# Patient Record
Sex: MALE | Race: WHITE | NOT HISPANIC OR LATINO | Employment: UNEMPLOYED | URBAN - METROPOLITAN AREA
[De-identification: names, ages, dates, MRNs, and addresses within clinical notes are randomized per-mention and may not be internally consistent; named-entity substitution may affect disease eponyms.]

---

## 2024-07-11 ENCOUNTER — APPOINTMENT (OUTPATIENT)
Dept: RADIOLOGY | Facility: HOSPITAL | Age: 44
End: 2024-07-11

## 2024-07-11 ENCOUNTER — HOSPITAL ENCOUNTER (EMERGENCY)
Facility: HOSPITAL | Age: 44
Discharge: HOME | End: 2024-07-11
Attending: EMERGENCY MEDICINE

## 2024-07-11 VITALS
BODY MASS INDEX: 21.47 KG/M2 | SYSTOLIC BLOOD PRESSURE: 150 MMHG | TEMPERATURE: 98.2 F | WEIGHT: 150 LBS | OXYGEN SATURATION: 100 % | HEART RATE: 65 BPM | DIASTOLIC BLOOD PRESSURE: 89 MMHG | RESPIRATION RATE: 18 BRPM | HEIGHT: 70 IN

## 2024-07-11 DIAGNOSIS — S61.310A LACERATION OF RIGHT INDEX FINGER WITHOUT FOREIGN BODY WITH DAMAGE TO NAIL, INITIAL ENCOUNTER: ICD-10-CM

## 2024-07-11 DIAGNOSIS — S62.630B OPEN DISPLACED FRACTURE OF DISTAL PHALANX OF RIGHT INDEX FINGER, INITIAL ENCOUNTER: Primary | ICD-10-CM

## 2024-07-11 PROCEDURE — 73140 X-RAY EXAM OF FINGER(S): CPT | Mod: RT

## 2024-07-11 PROCEDURE — 2500000001 HC RX 250 WO HCPCS SELF ADMINISTERED DRUGS (ALT 637 FOR MEDICARE OP): Performed by: EMERGENCY MEDICINE

## 2024-07-11 PROCEDURE — 99283 EMERGENCY DEPT VISIT LOW MDM: CPT | Mod: 25

## 2024-07-11 PROCEDURE — 73140 X-RAY EXAM OF FINGER(S): CPT | Mod: RIGHT SIDE | Performed by: RADIOLOGY

## 2024-07-11 PROCEDURE — 2500000005 HC RX 250 GENERAL PHARMACY W/O HCPCS: Performed by: EMERGENCY MEDICINE

## 2024-07-11 RX ORDER — CEPHALEXIN 500 MG/1
500 CAPSULE ORAL 4 TIMES DAILY
Qty: 12 CAPSULE | Refills: 0 | Status: SHIPPED | OUTPATIENT
Start: 2024-07-11 | End: 2024-07-14

## 2024-07-11 RX ORDER — IBUPROFEN 400 MG/1
400 TABLET ORAL ONCE
Status: COMPLETED | OUTPATIENT
Start: 2024-07-11 | End: 2024-07-11

## 2024-07-11 RX ORDER — AMOXICILLIN AND CLAVULANATE POTASSIUM 875; 125 MG/1; MG/1
1 TABLET, FILM COATED ORAL ONCE
Status: COMPLETED | OUTPATIENT
Start: 2024-07-11 | End: 2024-07-11

## 2024-07-11 RX ORDER — LIDOCAINE HYDROCHLORIDE 20 MG/ML
5 INJECTION, SOLUTION INFILTRATION; PERINEURAL ONCE
Status: COMPLETED | OUTPATIENT
Start: 2024-07-11 | End: 2024-07-11

## 2024-07-11 RX ADMIN — LIDOCAINE HYDROCHLORIDE 5 ML: 20 INJECTION, SOLUTION INFILTRATION; PERINEURAL at 02:10

## 2024-07-11 RX ADMIN — AMOXICILLIN AND CLAVULANATE POTASSIUM 1 TABLET: 875; 125 TABLET, FILM COATED ORAL at 02:10

## 2024-07-11 RX ADMIN — IBUPROFEN 400 MG: 400 TABLET, FILM COATED ORAL at 02:10

## 2024-07-11 ASSESSMENT — LIFESTYLE VARIABLES
EVER HAD A DRINK FIRST THING IN THE MORNING TO STEADY YOUR NERVES TO GET RID OF A HANGOVER: NO
TOTAL SCORE: 0
EVER FELT BAD OR GUILTY ABOUT YOUR DRINKING: NO
HAVE YOU EVER FELT YOU SHOULD CUT DOWN ON YOUR DRINKING: NO
HAVE PEOPLE ANNOYED YOU BY CRITICIZING YOUR DRINKING: NO

## 2024-07-11 ASSESSMENT — PAIN DESCRIPTION - LOCATION: LOCATION: FINGER (COMMENT WHICH ONE)

## 2024-07-11 ASSESSMENT — PAIN - FUNCTIONAL ASSESSMENT: PAIN_FUNCTIONAL_ASSESSMENT: 0-10

## 2024-07-11 ASSESSMENT — PAIN DESCRIPTION - ORIENTATION: ORIENTATION: RIGHT

## 2024-07-11 ASSESSMENT — COLUMBIA-SUICIDE SEVERITY RATING SCALE - C-SSRS
2. HAVE YOU ACTUALLY HAD ANY THOUGHTS OF KILLING YOURSELF?: NO
1. IN THE PAST MONTH, HAVE YOU WISHED YOU WERE DEAD OR WISHED YOU COULD GO TO SLEEP AND NOT WAKE UP?: NO
6. HAVE YOU EVER DONE ANYTHING, STARTED TO DO ANYTHING, OR PREPARED TO DO ANYTHING TO END YOUR LIFE?: NO

## 2024-07-11 ASSESSMENT — PAIN DESCRIPTION - PAIN TYPE: TYPE: ACUTE PAIN

## 2024-07-11 ASSESSMENT — PAIN SCALES - GENERAL: PAINLEVEL_OUTOF10: 6

## 2024-07-11 NOTE — ED TRIAGE NOTES
Working in a wood shop, cut right index finger with a knife. Bleeding controled. Tetanus up to date. Bleeding controled.

## 2024-07-11 NOTE — ED PROVIDER NOTES
Pt Name: Sawyer Sarah  MRN: [id number]  Birthdate 1980  Date of evaluation: 7/11/2024  [facility] ED      CHIEF COMPLAINT    Chief Complaint   Patient presents with    Finger Laceration     Knife injury in wood shop area       HPI  43 y.o. male presents with with right index finger laceration.  He is mostly right-handed.  Happened with the knife at home.  No fever.   Patient would like his wound with hanging tissue to be debrided otherwise he reports it will smell and get infected.    REVIEW OF SYSTEMS     Review of Systems    Pmh:  There is no problem list on file for this patient.      CURRENT MEDICATIONS       Previous Medications    No medications on file       No family history on file.    Social Determinants of Health     Tobacco Use: High Risk (11/7/2019)    Received from [facility]    Patient History     Smoking Tobacco Use: Every Day     Smokeless Tobacco Use: Never     Passive Exposure: Not on file   Alcohol Use: Not on file   Financial Resource Strain: Not on file   Food Insecurity: Not on file   Transportation Needs: Not on file   Physical Activity: Not on file   Stress: Not on file   Social Connections: Not on file   Intimate Partner Violence: Not on file   Depression: Not on file   Housing Stability: Not on file   Utilities: Not on file   Digital Equity: Not on file   Health Literacy: Not on file       Physical Exam  Vitals and nursing note reviewed.   Constitutional:       Appearance: He is not toxic-appearing.   Cardiovascular:      Rate and Rhythm: Normal rate and regular rhythm.      Pulses:           Radial pulses are 2+ on the right side.   Musculoskeletal:      Right wrist: Normal.      Right hand: Laceration and tenderness present.      Comments: Soft tissue laceration soft tissue amputation of right index finger with skin tissue missing   Skin:     Coloration: Skin is not jaundiced.      Findings: Laceration present.   Neurological:      Mental Status: He is alert.      Sensory:  "Sensation is intact.      Gait: Gait is intact.   Psychiatric:         Behavior: Behavior normal.         Thought Content: Thought content normal.       Vitals:    07/11/24 0130   BP: 150/89   Pulse: 65   Resp: 18   Temp: 36.8 °C (98.2 °F)   SpO2: 100%   Weight: 68 kg (150 lb)   Height: 1.778 m (5' 10\")         Medical Decision Making       SCREENINGS   [place] Coma Scale  Best Eye Response: Spontaneous  Best Verbal Response: Oriented  Best Motor Response: Follows commands  [place] Coma Scale Score: 15     Wound Care    Performed by: Samuel Abernathy MD  Authorized by: Samuel Abernathy MD    Consent:     Consent obtained:  Verbal    Consent given by:  Patient    Risks discussed:  Bleeding, infection, pain, poor cosmetic result, nerve damage and incomplete drainage  Procedure details:     Indications: open wounds      Wound location:  Finger    Finger location:  R index finger    Wound age (days):  <1    Wound surface area (sq cm):  1.5    Debridement performed: Yes      Debridement type: excisional      Debridement level: subcutaneous tissue      Debridement mechanism:  Scissors    Devitalized tissue debrided: necrotic debris    Comments:      Wound is not suturable as there is tissue missing.  Devitalized hanging by small tissue fragment tissue was debrided to allow for appropriate proper wound healing.         Imgaing:  XR fingers right 2+ views   Final Result   1. Soft tissue avulsion/laceration of the distal index finger.   2. Subtle punctate high-density focus at the radial aspect of the   laceration site which could reflect foreign body or small osseous   fragment.   3. Slight cortical irregularity of the distal phalanx tuft which   could reflect small chip fracture.        Signed by: Homar Alcala 7/11/2024 2:25 AM   Dictation workstation:   [id number]          Labs:  Labs Reviewed - No data to display    EKG:  No orders to display       Medications ordered:  Medications   gelatin sponge,absorb-porcine " (Gelfoam) sponge 1 each (has no administration in time range)   lidocaine (Xylocaine) 20 mg/mL (2 %) injection 5 mL (5 mL subcutaneous Given 7/11/24 0210)   amoxicillin-pot clavulanate (Augmentin) 875-125 mg per tablet 1 tablet (1 tablet oral Given 7/11/24 0210)   ibuprofen tablet 400 mg (400 mg oral Given 7/11/24 0210)         Orders placed during visit:  XR fingers right 2+ views   Final Result   1. Soft tissue avulsion/laceration of the distal index finger.   2. Subtle punctate high-density focus at the radial aspect of the   laceration site which could reflect foreign body or small osseous   fragment.   3. Slight cortical irregularity of the distal phalanx tuft which   could reflect small chip fracture.        Signed by: Homar Alcala 7/11/2024 2:25 AM   Dictation workstation:   [id number]          Diagnoses as of 07/11/24 0325   Laceration of right index finger without foreign body with damage to nail, initial encounter   Open displaced fracture of distal phalanx of right index finger, initial encounter       CONSULTS:  None    CRITICAL CARE TIME          MDM: 43 y.o. presented with  complaint of finger laceration.  The differential diagnosis considered: Fracture, laceration, soft tissue amputation.  Our workup consisted of ordering/reviewing:   Orders Placed This Encounter   Procedures    Wound Care    Wound Care    Wound Care    XR fingers right 2+ views    Application finger splint static   X-ray reviewed and interpreted by me: Radiopacity in the soft tissue there appears to be a fracture of line in the distal phalanx of the index finger.  Soft tissue laceration    Patient would likely open fracture of the index finger.  Wound was irrigated copiously by me.    Patient refused to wait for hand surgery consultation.  Risk benefits alternatives were discussed with the patient and he refused to wait for hand surgery consultation.  Patient agreed and understands risk of deformity, disability infection,  inability to are in a living.    Wound care with surgicell and  dressing  Procedure note:initial fracture care and  splinting or strapping  An aluminium foam splinting   by nurse to the fractured finger . Post splint application patient was neurovascularly intact and alignment was good  Samuel Abernathy MD         Clinical impression:  1. Open displaced fracture of distal phalanx of right index finger, initial encounter  cephalexin (Keflex) 500 mg capsule      2. Laceration of right index finger without foreign body with damage to nail, initial encounter  cephalexin (Keflex) 500 mg capsule          DISPOSITION/PLAN   DISPOSITION Discharge 07/11/2024 03:15:42 AM       I prescribed the following medications:  New Prescriptions    CEPHALEXIN (KEFLEX) 500 MG CAPSULE    Take 1 capsule (500 mg) by mouth 4 times a day for 3 days.       PATIENT REFERRED TO:  Josep Baum MD  [address] [facility]  [address]  [facility] [address]  [phone]    Call in 1 day      Conrad Frey MD  [address] [facility] [id number] [phone]    In 4 days      Sidney Espinosa MD  [address] [facility]  [address]  [facility] [id number] [phone]    Call in 1 day           Samuel Abernathy MD  07/11/24 0008